# Patient Record
Sex: FEMALE | Race: AMERICAN INDIAN OR ALASKA NATIVE | URBAN - NONMETROPOLITAN AREA
[De-identification: names, ages, dates, MRNs, and addresses within clinical notes are randomized per-mention and may not be internally consistent; named-entity substitution may affect disease eponyms.]

---

## 2018-03-28 ENCOUNTER — VIRTUAL VISIT (OUTPATIENT)
Dept: SLEEP MEDICINE | Facility: HOSPITAL | Age: 58
End: 2018-03-28
Attending: INTERNAL MEDICINE
Payer: COMMERCIAL

## 2018-03-28 NOTE — MR AVS SNAPSHOT
"              After Visit Summary   3/28/2018    Cielo Lima    MRN: [id number]           Patient Information     Date Of Birth          1960        Visit Information        Provider Department      3/28/2018 8:30 AM Srinivasan Russo MD [facility]         Follow-ups after your visit        Who to contact     If you have questions or need follow up information about today's clinic visit or your schedule please contact [facility] directly at [phone].  Normal or non-critical lab and imaging results will be communicated to you by [employer], letter or phone within 4 business days after the clinic has received the results. If you do not hear from us within 7 days, please contact the clinic through [employer] or phone. If you have a critical or abnormal lab result, we will notify you by phone as soon as possible.  Submit refill requests through [employer] or call your pharmacy and they will forward the refill request to us. Please allow 3 business days for your refill to be completed.          Additional Information About Your Visit        [email or web address] Information     [employer] lets you send messages to your doctor, view your test results, renew your prescriptions, schedule appointments and more. To sign up, go to [email or web address] . Click on \"Log in\" on the left side of the screen, which will take you to the Welcome page. Then click on \"Sign up Now\" on the right side of the page.     You will be asked to enter the access code listed below, as well as some personal information. Please follow the directions to create your username and password.     Your access code is: [email or web address]  Expires: 2018  2:20 PM     Your access code will  in 90 days. If you need help or a new code, please call your [place] clinic or [phone].        Care EveryWhere ID     This is your Care EveryWhere ID. This could be used by other organizations to access your [place] medical records  [id number]         Blood Pressure " from Last 3 Encounters:   No data found for BP    Weight from Last 3 Encounters:   No data found for Wt              Today, you had the following     No orders found for display       Primary Care Provider    None Specified       [facility] [address] [facility] [id number]        Equal Access to Services     MARISELBARBARA VARUN : Hadii aad ku hadmarcialo Soomaali, waaxda luqadaha, qaybta kaalmada adeegyada, jesus brein arjunn katelynntommy baker hema . So [facility] [phone].    ATENCIÓN: Si habla español, tiene a mckenzie disposición servicios gratuitos de asistencia lingüística. Llame al [phone].    We comply with applicable federal civil rights laws and [place] laws. We do not discriminate on the basis of race, color, national origin, age, disability, sex, sexual orientation, or gender identity.            Thank you!     Thank you for choosing [facility]  for your care. Our goal is always to provide you with excellent care. Hearing back from our patients is one way we can continue to improve our services. Please take a few minutes to complete the written survey that you may receive in the mail after your visit with us. Thank you!             Your Updated Medication List - Protect others around you: Learn how to safely use, store and throw away your medicines at www.disposemymeds.org.      Notice  As of 3/28/2018 11:59 PM    You have not been prescribed any medications.

## 2018-04-10 NOTE — PROGRESS NOTES
Service Date: 2018      [facility]         PATIENT NAME:  Cielo JC MR#:  [id number]      YOB: 1960      REFERRING PROVIDER:  Nataliya Ivy NP      DATE OF SERVICE:  2018      A 57-year-old female referred by Nataliya Ivy with a history of loud snoring, witnessed apneas and daytime somnolence.        Overnight 18-channel polysomnogram was done 2018, I reviewed the raw data in detail.  Sleep efficiency was normal at 91% with a shortened sleep latency and prolonged REM latency.  Sleep architecture shows all stages represented in appropriate amounts for age.  Baseline oxyhemoglobin saturation was 93%.  The ECG was monitored, and no arrhythmias were seen.  There were a few periodic leg movements with an arousal index of around 8 per hour.        Technician noted loud snoring, measured 16 apneas and 21 hypopneas early in the study.  These events were associated with EEG arousals and desats to a low of 82%.  The apnea plus hypopnea index was modestly elevated at 6 events per hour.  The patient was not tried on nasal CPAP.        IMPRESSION:  Obstructive sleep apnea - mild in degree, but given the hypersomnolence, it is reasonable to trial her on AutoPap.           TROY MCKOY MD             D: 04/10/2018   T: 04/10/2018   MT: NTS      Name:     CIELO MCCORD   MRN:      [id number]        Account:      [id number]   :      1960           Service Date: 2018      Document: [id number]